# Patient Record
Sex: FEMALE | Race: ASIAN | NOT HISPANIC OR LATINO | URBAN - METROPOLITAN AREA
[De-identification: names, ages, dates, MRNs, and addresses within clinical notes are randomized per-mention and may not be internally consistent; named-entity substitution may affect disease eponyms.]

---

## 2017-01-01 ENCOUNTER — EMERGENCY (EMERGENCY)
Facility: HOSPITAL | Age: 62
LOS: 1 days | Discharge: HOME | End: 2017-01-01
Attending: EMERGENCY MEDICINE | Admitting: EMERGENCY MEDICINE
Payer: MEDICAID

## 2017-01-01 VITALS
HEART RATE: 65 BPM | RESPIRATION RATE: 18 BRPM | DIASTOLIC BLOOD PRESSURE: 83 MMHG | OXYGEN SATURATION: 100 % | TEMPERATURE: 98 F | SYSTOLIC BLOOD PRESSURE: 147 MMHG

## 2017-01-01 VITALS
SYSTOLIC BLOOD PRESSURE: 140 MMHG | HEART RATE: 71 BPM | DIASTOLIC BLOOD PRESSURE: 71 MMHG | OXYGEN SATURATION: 98 % | TEMPERATURE: 98 F | RESPIRATION RATE: 18 BRPM

## 2017-01-01 PROBLEM — Z00.00 ENCOUNTER FOR PREVENTIVE HEALTH EXAMINATION: Status: ACTIVE | Noted: 2017-01-01

## 2017-01-01 PROCEDURE — 99283 EMERGENCY DEPT VISIT LOW MDM: CPT

## 2017-01-01 RX ORDER — IBUPROFEN 200 MG
600 TABLET ORAL ONCE
Qty: 0 | Refills: 0 | Status: DISCONTINUED | OUTPATIENT
Start: 2017-01-01 | End: 2017-01-05

## 2017-01-01 RX ORDER — LIDOCAINE 4 G/100G
1 CREAM TOPICAL ONCE
Qty: 0 | Refills: 0 | Status: DISCONTINUED | OUTPATIENT
Start: 2017-01-01 | End: 2017-01-05

## 2017-01-01 RX ORDER — VALACYCLOVIR 500 MG/1
1 TABLET, FILM COATED ORAL
Qty: 21 | Refills: 0 | OUTPATIENT
Start: 2017-01-01 | End: 2017-01-08

## 2017-01-01 RX ORDER — LIDOCAINE HCL, METHYL SALICYLATE, MENTHOL, CAPSAICIN .04; 33.28; 12.1; 4.84 G/121G; G/121G; G/121G; G/121G
1 OINTMENT TOPICAL
Qty: 1 | Refills: 0 | OUTPATIENT
Start: 2017-01-01

## 2017-01-01 RX ORDER — VALACYCLOVIR 500 MG/1
1000 TABLET, FILM COATED ORAL ONCE
Qty: 0 | Refills: 0 | Status: COMPLETED | OUTPATIENT
Start: 2017-01-01 | End: 2017-01-01

## 2017-01-01 RX ADMIN — VALACYCLOVIR 1000 MILLIGRAM(S): 500 TABLET, FILM COATED ORAL at 17:19

## 2017-01-01 NOTE — ED ADULT NURSE NOTE - OBJECTIVE STATEMENT
Pt received to room 13, Aox3 and ambulatory. C/o rash on left arm and burning pain in arm and back upon movement. Patient states she has to take motrin q4 hours since last week to prevent the pain. Denies SOB, CP, N/V, dizziness, fever. States she has had some loss of apetite recently. Rash on arm looks like little bumps, on arm, hand and deltoid. Vitally stable, will continue to monitor.

## 2017-01-01 NOTE — ED PROVIDER NOTE - ATTENDING CONTRIBUTION TO CARE
I performed the initial face to face bedside interview with this patient regarding history of present illness, review of symptoms and past medical, social and family history and independent physical examination alongside the resident, Sarah Beth Figueroa.  I wrote the chart and discussed the case, impression, and plan with the resident and patient.

## 2017-01-01 NOTE — ED ADULT TRIAGE NOTE - CHIEF COMPLAINT QUOTE
Pt states since last week she had "burning" pain to left upper back and left arm. Yesterday pt noticed red dots to left arm. Co pain to rash area.

## 2017-01-01 NOTE — ED PROVIDER NOTE - OBJECTIVE STATEMENT
62 yo female with a rash and pain on her left arm.  Pt states she had arm pain for one week and noticed a vesicular rash yesterday.  she denies fevers chills nausea chest pain lightheadedness.  She has no sick contacts

## 2018-02-21 ENCOUNTER — EMERGENCY (EMERGENCY)
Facility: HOSPITAL | Age: 63
LOS: 1 days | Discharge: HOME | End: 2018-02-21
Attending: EMERGENCY MEDICINE | Admitting: EMERGENCY MEDICINE
Payer: MEDICAID

## 2018-02-21 VITALS
SYSTOLIC BLOOD PRESSURE: 118 MMHG | DIASTOLIC BLOOD PRESSURE: 66 MMHG | HEART RATE: 69 BPM | RESPIRATION RATE: 16 BRPM | TEMPERATURE: 98 F | OXYGEN SATURATION: 99 %

## 2018-02-21 PROCEDURE — 99282 EMERGENCY DEPT VISIT SF MDM: CPT

## 2018-02-21 NOTE — ED PROVIDER NOTE - OBJECTIVE STATEMENT
: [id number]  63F h/o HLD p/w bilateral eye itching x 1 week, R eye tearing for a few days. No purulence. Denies visual change, fever, contact lenses, trauma.

## 2018-02-21 NOTE — ED PROVIDER NOTE - ATTENDING CONTRIBUTION TO CARE
branden: hx thru pt, with assistance of  phone.  one week hx of irritated itching eyes. rt eye tearing more than usual past few days. clear tears.   no visual changes. no uri sx. no hx similar sx.   exam: vision rt eye 20/50; left 20/40.   no redness or swelling noted. pupils equal and reactive. lid everted, no FB.  rt cornea clear, pre and post fluoroscein.   impression: likely allergic-type sx,  recc: visine and tears. pt will f/u with her optometrist tomorrow for f/u glaucoma testing (last tested 2-3 years ago; pt told was nl). branden: hx thru pt, with assistance of  phone.  one week hx of irritated itching eyes. rt eye tearing more than usual past few days. clear tears.   no visual changes. no uri sx. no hx similar sx.   exam: vision rt eye 20/50; left 20/40. .  no redness or swelling noted. pupils equal and reactive. lid everted, no FB.  rt cornea clear, pre and post fluoroscein.   impression: likely allergic-type sx,  recc: visine and tears. pt will f/u with her optometrist tomorrow for f/u glaucoma testing (last tested 2-3 years ago; pt told was nl).

## 2018-02-21 NOTE — ED ADULT TRIAGE NOTE - CHIEF COMPLAINT QUOTE
pt c/o watery right eye x 1 week, states that she has pain in her eye.  Denies visual changes.  PMH: high cholesterol, osteoporosis

## 2018-02-21 NOTE — ED PROVIDER NOTE - EYE EXAM METHOD
bilateral PERRL, EOMI, no FB, inverted lids with no FB. No abrasions/ulcers on fluorescein/fluorescein/slit lamp

## 2019-07-20 ENCOUNTER — EMERGENCY (EMERGENCY)
Facility: HOSPITAL | Age: 64
LOS: 1 days | Discharge: HOME | End: 2019-07-20
Attending: EMERGENCY MEDICINE | Admitting: EMERGENCY MEDICINE
Payer: COMMERCIAL

## 2019-07-20 VITALS
SYSTOLIC BLOOD PRESSURE: 139 MMHG | RESPIRATION RATE: 16 BRPM | OXYGEN SATURATION: 100 % | HEART RATE: 51 BPM | TEMPERATURE: 98 F | DIASTOLIC BLOOD PRESSURE: 91 MMHG

## 2019-07-20 PROCEDURE — 99283 EMERGENCY DEPT VISIT LOW MDM: CPT

## 2019-07-20 RX ORDER — CLOTRIMAZOLE AND BETAMETHASONE DIPROPIONATE 10; .5 MG/G; MG/G
1 CREAM TOPICAL
Qty: 1 | Refills: 0
Start: 2019-07-20 | End: 2019-08-02

## 2019-07-20 NOTE — ED ADULT TRIAGE NOTE - CHIEF COMPLAINT QUOTE
Pt w/ hx of osteoporosis c/o atraumatic scabbing and bleeding on top of right foot.  dried blood and scabs noted to right foot.

## 2019-07-20 NOTE — ED PROVIDER NOTE - CLINICAL SUMMARY MEDICAL DECISION MAKING FREE TEXT BOX
Rt foot dorsum tinea dc on clotrimazole-bethamethasone. Rt foot sole wart, referred to PMD or pods to freeze wart.

## 2019-07-20 NOTE — ED PROVIDER NOTE - OBJECTIVE STATEMENT
64F c/o rt foot rash x 3 mo. Past 3 months patient notes dorsum right foot discolored and itchy. PMD prescribed 2 creams without resolution. Patient also notes lesion to base of right foot.

## 2019-07-20 NOTE — ED PROVIDER NOTE - NSFOLLOWUPINSTRUCTIONS_ED_ALL_ED_FT
Seen in ER for right shin and foot fungal infection and base of right foot wart. Please apply Clotrimazole-Betamethasone ointment twice daily x 2 wks. Please see Primary Care doctor or Podiatrist from list in 1 week. Return to Emergency Room for any new or worsening symptoms.

## 2019-07-21 PROBLEM — E78.5 HYPERLIPIDEMIA, UNSPECIFIED: Chronic | Status: ACTIVE | Noted: 2018-02-21

## 2020-01-06 ENCOUNTER — EMERGENCY (EMERGENCY)
Facility: HOSPITAL | Age: 65
LOS: 1 days | Discharge: HOME | End: 2020-01-06
Attending: EMERGENCY MEDICINE
Payer: MEDICAID

## 2020-01-06 VITALS
HEART RATE: 77 BPM | SYSTOLIC BLOOD PRESSURE: 101 MMHG | DIASTOLIC BLOOD PRESSURE: 63 MMHG | OXYGEN SATURATION: 99 % | RESPIRATION RATE: 20 BRPM | WEIGHT: 104.94 LBS | HEIGHT: 55 IN | TEMPERATURE: 98 F

## 2020-01-06 DIAGNOSIS — Z90.49 ACQUIRED ABSENCE OF OTHER SPECIFIED PARTS OF DIGESTIVE TRACT: Chronic | ICD-10-CM

## 2020-01-06 LAB
ALBUMIN SERPL ELPH-MCNC: 4.1 G/DL — SIGNIFICANT CHANGE UP (ref 3.3–5)
ALP SERPL-CCNC: 63 U/L — SIGNIFICANT CHANGE UP (ref 40–120)
ALT FLD-CCNC: 16 U/L — SIGNIFICANT CHANGE UP (ref 10–45)
ANION GAP SERPL CALC-SCNC: 11 MMOL/L — SIGNIFICANT CHANGE UP (ref 5–17)
APPEARANCE UR: CLEAR — SIGNIFICANT CHANGE UP
APTT BLD: 29.2 SEC — SIGNIFICANT CHANGE UP (ref 27.5–36.3)
AST SERPL-CCNC: 43 U/L — HIGH (ref 10–40)
BACTERIA # UR AUTO: NEGATIVE — SIGNIFICANT CHANGE UP
BASOPHILS # BLD AUTO: 0 K/UL — SIGNIFICANT CHANGE UP (ref 0–0.2)
BASOPHILS NFR BLD AUTO: 0 % — SIGNIFICANT CHANGE UP (ref 0–2)
BILIRUB SERPL-MCNC: 0.4 MG/DL — SIGNIFICANT CHANGE UP (ref 0.2–1.2)
BILIRUB UR-MCNC: NEGATIVE — SIGNIFICANT CHANGE UP
BUN SERPL-MCNC: 12 MG/DL — SIGNIFICANT CHANGE UP (ref 7–23)
CALCIUM SERPL-MCNC: 8.7 MG/DL — SIGNIFICANT CHANGE UP (ref 8.4–10.5)
CHLORIDE SERPL-SCNC: 102 MMOL/L — SIGNIFICANT CHANGE UP (ref 96–108)
CO2 SERPL-SCNC: 22 MMOL/L — SIGNIFICANT CHANGE UP (ref 22–31)
COLOR SPEC: YELLOW — SIGNIFICANT CHANGE UP
CREAT SERPL-MCNC: 0.48 MG/DL — LOW (ref 0.5–1.3)
DIFF PNL FLD: ABNORMAL
EOSINOPHIL # BLD AUTO: 0 K/UL — SIGNIFICANT CHANGE UP (ref 0–0.5)
EOSINOPHIL NFR BLD AUTO: 0 % — SIGNIFICANT CHANGE UP (ref 0–6)
EPI CELLS # UR: 2 /HPF — SIGNIFICANT CHANGE UP
GAS PNL BLDV: SIGNIFICANT CHANGE UP
GLUCOSE SERPL-MCNC: 103 MG/DL — HIGH (ref 70–99)
GLUCOSE UR QL: NEGATIVE — SIGNIFICANT CHANGE UP
HCT VFR BLD CALC: 41.2 % — SIGNIFICANT CHANGE UP (ref 34.5–45)
HGB BLD-MCNC: 12.5 G/DL — SIGNIFICANT CHANGE UP (ref 11.5–15.5)
HYALINE CASTS # UR AUTO: 6 /LPF — HIGH (ref 0–2)
INR BLD: 1.1 RATIO — SIGNIFICANT CHANGE UP (ref 0.88–1.16)
KETONES UR-MCNC: ABNORMAL
LEUKOCYTE ESTERASE UR-ACNC: NEGATIVE — SIGNIFICANT CHANGE UP
LIDOCAIN IGE QN: 26 U/L — SIGNIFICANT CHANGE UP (ref 7–60)
LYMPHOCYTES # BLD AUTO: 0.34 K/UL — LOW (ref 1–3.3)
LYMPHOCYTES # BLD AUTO: 4.4 % — LOW (ref 13–44)
MCHC RBC-ENTMCNC: 26.4 PG — LOW (ref 27–34)
MCHC RBC-ENTMCNC: 30.3 GM/DL — LOW (ref 32–36)
MCV RBC AUTO: 86.9 FL — SIGNIFICANT CHANGE UP (ref 80–100)
MONOCYTES # BLD AUTO: 0.07 K/UL — SIGNIFICANT CHANGE UP (ref 0–0.9)
MONOCYTES NFR BLD AUTO: 0.9 % — LOW (ref 2–14)
NEUTROPHILS # BLD AUTO: 7.38 K/UL — SIGNIFICANT CHANGE UP (ref 1.8–7.4)
NEUTROPHILS NFR BLD AUTO: 94.7 % — HIGH (ref 43–77)
NITRITE UR-MCNC: NEGATIVE — SIGNIFICANT CHANGE UP
PH UR: 6 — SIGNIFICANT CHANGE UP (ref 5–8)
PLATELET # BLD AUTO: 144 K/UL — LOW (ref 150–400)
POTASSIUM SERPL-MCNC: 5.5 MMOL/L — HIGH (ref 3.5–5.3)
POTASSIUM SERPL-SCNC: 5.5 MMOL/L — HIGH (ref 3.5–5.3)
PROT SERPL-MCNC: 7.6 G/DL — SIGNIFICANT CHANGE UP (ref 6–8.3)
PROT UR-MCNC: ABNORMAL
PROTHROM AB SERPL-ACNC: 12.7 SEC — SIGNIFICANT CHANGE UP (ref 10–12.9)
RBC # BLD: 4.74 M/UL — SIGNIFICANT CHANGE UP (ref 3.8–5.2)
RBC # FLD: 13.8 % — SIGNIFICANT CHANGE UP (ref 10.3–14.5)
RBC CASTS # UR COMP ASSIST: 48 /HPF — HIGH (ref 0–4)
SODIUM SERPL-SCNC: 135 MMOL/L — SIGNIFICANT CHANGE UP (ref 135–145)
SP GR SPEC: 1.04 — HIGH (ref 1.01–1.02)
UROBILINOGEN FLD QL: NEGATIVE — SIGNIFICANT CHANGE UP
WBC # BLD: 7.79 K/UL — SIGNIFICANT CHANGE UP (ref 3.8–10.5)
WBC # FLD AUTO: 7.79 K/UL — SIGNIFICANT CHANGE UP (ref 3.8–10.5)
WBC UR QL: 4 /HPF — SIGNIFICANT CHANGE UP (ref 0–5)

## 2020-01-06 PROCEDURE — 85014 HEMATOCRIT: CPT

## 2020-01-06 PROCEDURE — 85730 THROMBOPLASTIN TIME PARTIAL: CPT

## 2020-01-06 PROCEDURE — 85610 PROTHROMBIN TIME: CPT

## 2020-01-06 PROCEDURE — 76700 US EXAM ABDOM COMPLETE: CPT | Mod: 26

## 2020-01-06 PROCEDURE — 93010 ELECTROCARDIOGRAM REPORT: CPT

## 2020-01-06 PROCEDURE — 83690 ASSAY OF LIPASE: CPT

## 2020-01-06 PROCEDURE — 99284 EMERGENCY DEPT VISIT MOD MDM: CPT

## 2020-01-06 PROCEDURE — 84132 ASSAY OF SERUM POTASSIUM: CPT

## 2020-01-06 PROCEDURE — 82803 BLOOD GASES ANY COMBINATION: CPT

## 2020-01-06 PROCEDURE — 82565 ASSAY OF CREATININE: CPT

## 2020-01-06 PROCEDURE — 74177 CT ABD & PELVIS W/CONTRAST: CPT | Mod: 26

## 2020-01-06 PROCEDURE — 96374 THER/PROPH/DIAG INJ IV PUSH: CPT

## 2020-01-06 PROCEDURE — 71046 X-RAY EXAM CHEST 2 VIEWS: CPT

## 2020-01-06 PROCEDURE — 83605 ASSAY OF LACTIC ACID: CPT

## 2020-01-06 PROCEDURE — 82330 ASSAY OF CALCIUM: CPT

## 2020-01-06 PROCEDURE — 96375 TX/PRO/DX INJ NEW DRUG ADDON: CPT

## 2020-01-06 PROCEDURE — 82435 ASSAY OF BLOOD CHLORIDE: CPT

## 2020-01-06 PROCEDURE — 82947 ASSAY GLUCOSE BLOOD QUANT: CPT

## 2020-01-06 PROCEDURE — 93005 ELECTROCARDIOGRAM TRACING: CPT

## 2020-01-06 PROCEDURE — 80053 COMPREHEN METABOLIC PANEL: CPT

## 2020-01-06 PROCEDURE — 71046 X-RAY EXAM CHEST 2 VIEWS: CPT | Mod: 26

## 2020-01-06 PROCEDURE — 74177 CT ABD & PELVIS W/CONTRAST: CPT

## 2020-01-06 PROCEDURE — 84295 ASSAY OF SERUM SODIUM: CPT

## 2020-01-06 PROCEDURE — 76700 US EXAM ABDOM COMPLETE: CPT

## 2020-01-06 PROCEDURE — 81001 URINALYSIS AUTO W/SCOPE: CPT

## 2020-01-06 PROCEDURE — 99285 EMERGENCY DEPT VISIT HI MDM: CPT | Mod: 25

## 2020-01-06 PROCEDURE — 85027 COMPLETE CBC AUTOMATED: CPT

## 2020-01-06 PROCEDURE — 87086 URINE CULTURE/COLONY COUNT: CPT

## 2020-01-06 RX ORDER — ONDANSETRON 8 MG/1
4 TABLET, FILM COATED ORAL ONCE
Refills: 0 | Status: DISCONTINUED | OUTPATIENT
Start: 2020-01-06 | End: 2020-01-06

## 2020-01-06 RX ORDER — SODIUM CHLORIDE 9 MG/ML
1000 INJECTION INTRAMUSCULAR; INTRAVENOUS; SUBCUTANEOUS ONCE
Refills: 0 | Status: COMPLETED | OUTPATIENT
Start: 2020-01-06 | End: 2020-01-06

## 2020-01-06 RX ORDER — ONDANSETRON 8 MG/1
4 TABLET, FILM COATED ORAL ONCE
Refills: 0 | Status: COMPLETED | OUTPATIENT
Start: 2020-01-06 | End: 2020-01-06

## 2020-01-06 RX ORDER — ACETAMINOPHEN 500 MG
975 TABLET ORAL ONCE
Refills: 0 | Status: COMPLETED | OUTPATIENT
Start: 2020-01-06 | End: 2020-01-06

## 2020-01-06 RX ORDER — FAMOTIDINE 10 MG/ML
20 INJECTION INTRAVENOUS ONCE
Refills: 0 | Status: COMPLETED | OUTPATIENT
Start: 2020-01-06 | End: 2020-01-06

## 2020-01-06 RX ADMIN — ONDANSETRON 4 MILLIGRAM(S): 8 TABLET, FILM COATED ORAL at 22:30

## 2020-01-06 RX ADMIN — FAMOTIDINE 20 MILLIGRAM(S): 10 INJECTION INTRAVENOUS at 22:25

## 2020-01-06 RX ADMIN — Medication 975 MILLIGRAM(S): at 22:25

## 2020-01-06 RX ADMIN — SODIUM CHLORIDE 1000 MILLILITER(S): 9 INJECTION INTRAMUSCULAR; INTRAVENOUS; SUBCUTANEOUS at 23:23

## 2020-01-06 RX ADMIN — Medication 30 MILLILITER(S): at 22:25

## 2020-01-06 NOTE — ED PROVIDER NOTE - NSFOLLOWUPINSTRUCTIONS_ED_ALL_ED_FT
You have a viral infection of your intestinal tract - your body will fight this off in a few days  1.Continue to hydrate yourself well throughout the day   2. Take zofran up to every 8 hours as needed for nausea/vomiting  3. Follow-up with your primary care physician this week   4. Return to the ER for any fevers, severe pain, persistent vomiting, urinary symptoms, or other new/concerning symptoms

## 2020-01-06 NOTE — ED PROVIDER NOTE - PATIENT PORTAL LINK FT
You can access the FollowMyHealth Patient Portal offered by [facility] by registering at the following website: [email or web address]. By joining [employer]’s FollowMyHealth portal, you will also be able to view your health information using other applications (apps) compatible with our system.

## 2020-01-06 NOTE — ED PROVIDER NOTE - OBJECTIVE STATEMENT
63 y/o female hx colon resection many years ago for unknown reason presents to the ED for acute onset of RUQ/epigasatric pain this morning. + nausea with dry heaving but no true vomiting. no diarrhea, no dysuria. no fever/chills.

## 2020-01-06 NOTE — ED PROVIDER NOTE - PROGRESS NOTE DETAILS
Dr. Holland's Note: At the beginning of my shift, i took over care of the patient from outgoing physician. On my evaluation, patient is looking very well, tolerating oral intake without difficulty. she feels much better than on arrival. CT came back showing enteritis without any other acute process.  Pt feels comfortable going home. I will prescribe her zofran and advised on oral hydration. pt knows to return to ED if sx worsen.
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
## 2020-01-06 NOTE — ED ADULT NURSE REASSESSMENT NOTE - NS ED NURSE REASSESS COMMENT FT1
Pt vomited immediately after taking Tylenol and Maalox. Dr. Chilel and Elise SEE notified and Zofran given as ordered.
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
## 2020-01-06 NOTE — ED PROVIDER NOTE - CARE PLAN
Principal Discharge DX:	Fever Principal Discharge DX:	Enteritis  Secondary Diagnosis:	Non-intractable vomiting with nausea

## 2020-01-07 VITALS
SYSTOLIC BLOOD PRESSURE: 111 MMHG | RESPIRATION RATE: 16 BRPM | OXYGEN SATURATION: 97 % | HEART RATE: 60 BPM | DIASTOLIC BLOOD PRESSURE: 63 MMHG | TEMPERATURE: 98 F

## 2020-01-07 RX ORDER — ONDANSETRON 8 MG/1
1 TABLET, FILM COATED ORAL
Qty: 15 | Refills: 0
Start: 2020-01-07

## 2020-01-08 LAB
CULTURE RESULTS: SIGNIFICANT CHANGE UP
SPECIMEN SOURCE: SIGNIFICANT CHANGE UP

## 2020-01-23 NOTE — ED ADULT NURSE NOTE - NS ED NURSE LEVEL OF CONSCIOUSNESS MENTAL STATUS
From: Preethi Frey  To: Estela Mistry MD  Sent: 1/22/2020 10:15 PM CST  Subject: Lab Test or Test Related Question    Hi Dr. Mistry, I was wondering if my pap results were back and also I know this isnt what you wanted to hear from me but I would like the IUD taken out. The bleeding it is causing is worse than what I was dealing with before. I know it takes time to adjust but I cannot handle 3-6 months of this. Maricel been bleeding since this was put in and the clots maricel been passing are very large for me. We could discuss another alternative if that is my obly other option. Thank you for your understating.   Alert/Awake

## 2021-07-07 NOTE — ED PROVIDER NOTE - MUSCULOSKELETAL, MLM
Render Post-Care Instructions In Note?: no Post-Care Instructions: I reviewed with the patient in detail post-care instructions. Patient is to wear sunprotection, and avoid picking at any of the treated lesions. Pt may apply Vaseline to crusted or scabbing areas. Number Of Freeze-Thaw Cycles: 1 freeze-thaw cycle Medical Necessity Clause: This procedure was medically necessary because the lesions that were treated were: Medical Necessity Information: It is in your best interest to select a reason for this procedure from the list below. All of these items fulfill various CMS LCD requirements except the new and changing color options. Consent: The patient's consent was obtained including but not limited to risks of crusting, scabbing, blistering, scarring, darker or lighter pigmentary change, recurrence, incomplete removal and infection. Detail Level: Simple Spine appears normal, range of motion is not limited, no muscle or joint tenderness

## 2023-09-02 ENCOUNTER — EMERGENCY (EMERGENCY)
Facility: HOSPITAL | Age: 68
LOS: 1 days | Discharge: HOME | End: 2023-09-02
Attending: EMERGENCY MEDICINE
Payer: MEDICARE

## 2023-09-02 VITALS
DIASTOLIC BLOOD PRESSURE: 68 MMHG | HEART RATE: 96 BPM | HEIGHT: 59 IN | TEMPERATURE: 98 F | OXYGEN SATURATION: 97 % | RESPIRATION RATE: 16 BRPM | SYSTOLIC BLOOD PRESSURE: 163 MMHG | WEIGHT: 110.01 LBS

## 2023-09-02 DIAGNOSIS — Z90.49 ACQUIRED ABSENCE OF OTHER SPECIFIED PARTS OF DIGESTIVE TRACT: Chronic | ICD-10-CM

## 2023-09-02 PROCEDURE — 99284 EMERGENCY DEPT VISIT MOD MDM: CPT
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
## 2023-09-03 PROCEDURE — 73610 X-RAY EXAM OF ANKLE: CPT | Mod: 26,LT

## 2023-09-03 PROCEDURE — 72131 CT LUMBAR SPINE W/O DYE: CPT | Mod: 26,MA

## 2023-09-03 PROCEDURE — 99284 EMERGENCY DEPT VISIT MOD MDM: CPT | Mod: 25

## 2023-09-03 PROCEDURE — 73610 X-RAY EXAM OF ANKLE: CPT

## 2023-09-03 PROCEDURE — 72131 CT LUMBAR SPINE W/O DYE: CPT | Mod: MA

## 2023-09-03 RX ORDER — ACETAMINOPHEN 500 MG
975 TABLET ORAL ONCE
Refills: 0 | Status: DISCONTINUED | OUTPATIENT
Start: 2023-09-03 | End: 2023-09-06

## 2023-09-03 NOTE — ED PROVIDER NOTE - NSFOLLOWUPCLINICS_GEN_ALL_ED_FT
[facility]  Orthopedics  [address] [place] [address]  Phone: [phone]  Fax: [phone]
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
## 2023-09-03 NOTE — ED PROVIDER NOTE - CARDIOVASCULAR NEGATIVE STATEMENT, MLM
In setting of bipap dependence will hold beta blocker and home lisinopril in setting of ANÍBAL. Will continue Tele-monitoring.   no chest pain and no edema.

## 2023-09-03 NOTE — ED PROVIDER NOTE - PATIENT PORTAL LINK FT
You can access the FollowMyHealth Patient Portal offered by [facility] by registering at the following website: [email or web address]. By joining [employer]’s FollowMyHealth portal, you will also be able to view your health information using other applications (apps) compatible with our system.

## 2023-09-03 NOTE — ED PROVIDER NOTE - PROGRESS NOTE DETAILS
feels well. neuro intact. xray negaive. ct with l1 compression fx. will dc. f/u with spine. return precautions discussed.
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
## 2023-09-03 NOTE — ED PROVIDER NOTE - NSFOLLOWUPINSTRUCTIONS_ED_ALL_ED_FT
Vertebral Compression Fracture    WHAT YOU NEED TO KNOW:    A vertebral compression fracture (VCF) is a collapse or breakdown in a bone in your spine. Compression fractures happen when there is too much pressure on the vertebra. VCFs most often occur in the thoracic (middle) and lumbar (lower) areas of your spine. Fractures may be mild to severe.  Compression Fracture of Spine    WHILE YOU ARE HERE:    Informed consent is a legal document that explains the tests, treatments, or procedures that you may need. Informed consent means you understand what will be done and can make decisions about what you want. You give your permission when you sign the consent form. You can have someone sign this form for you if you are not able to sign it. You have the right to understand your medical care in words you know. Before you sign the consent form, understand the risks and benefits of what will be done. Make sure all your questions are answered.    Medicines:    NSAIDs help decrease swelling and pain.    Acetaminophen decreases pain.    Prescription pain medicine may be given. Do not wait until the pain is severe before you ask for more medicine.    Bisphosphonates and calcitonin may be recommended to help your bones get stronger. They can decrease the pain of a VCF caused by osteoporosis, and decrease your risk for another fracture.  Tests:    X-rays, a CT scan, or MRI of your spine may be taken. You may be given a dye before the pictures are taken to help healthcare providers see the pictures better. Tell the healthcare provider if you have ever had an allergic reaction to contrast dye. You should not enter the MRI room with anything metal. Metal can cause serious injury. Tell the healthcare provider if you have any metal in or on your body.    A bone scan of your spine may be done. The pictures may show broken bones or other problems in the spine, such as an infection.  Treatment:    Surgery may be needed if your pain, weakness, or numbness does not go away after other treatments. Surgery may make your spine more stable, and help decrease pressure on your spinal nerves caused by the fracture.  Vertebroplasty is a procedure that is used to place bone cement into the fractured vertebrae.    Kyphoplasty is a procedure that uses a balloon to make a space in the fractured vertebrae. Bone cement is then put inside the space made by the balloon.    Open surgery returns bones to the right place and holds them together with wires, pins, plates, or screws.    Physical or occupational therapy may be started while you are in the hospital and continued after you are discharged. A physical therapist teaches you exercises to help improve movement and strength, and to decrease pain. An occupational therapist teaches you skills to help with your daily activities.  RISKS:    Even with treatment, signs and symptoms of a VCF may come back. You may develop a life-threatening blood clot if you are on bed rest or not able to move easily.    CARE AGREEMENT:    You have the right to help plan your care. Learn about your health condition and how it may be treated. Discuss treatment options with your healthcare providers to decide what care you want to receive. You always have the right to refuse treatment.    © [employer] 1973, 2023    	  back to top            © [employer] 1973, 2023

## 2023-09-03 NOTE — ED PROVIDER NOTE - PHYSICAL EXAMINATION
mild ttp lumbar spine. b/l LE strength 5/5 all muscle groups and gross sensation normal and equal in all dermatomes. ambulatory with steady gait.    left ankle: mild ttp over lateral malleolus. full ROM.

## 2023-09-03 NOTE — ED PROVIDER NOTE - CARE PROVIDER_API CALL
Danny Moe  Neurosurgery  [address] [place] [address]  Phone: [phone]  Fax: [phone]  Established Patient  Follow Up Time:

## 2023-09-03 NOTE — ED ADULT NURSE NOTE - NSFALLUNIVINTERV_ED_ALL_ED
Bed/Stretcher in lowest position, wheels locked, appropriate side rails in place/Call bell, personal items and telephone in reach/Instruct patient to call for assistance before getting out of bed/chair/stretcher/Non-slip footwear applied when patient is off stretcher/[place] to call system/Physically safe environment - no spills, clutter or unnecessary equipment/Purposeful proactive rounding/Room/bathroom lighting operational, light cord in reach

## 2023-09-12 ENCOUNTER — APPOINTMENT (OUTPATIENT)
Dept: ORTHOPEDIC SURGERY | Facility: CLINIC | Age: 68
End: 2023-09-12
Payer: MEDICARE

## 2023-09-12 PROCEDURE — 72070 X-RAY EXAM THORAC SPINE 2VWS: CPT

## 2023-09-12 PROCEDURE — 72040 X-RAY EXAM NECK SPINE 2-3 VW: CPT

## 2023-09-12 PROCEDURE — 22310 CLOSED TX VERT FX W/O MANJ: CPT

## 2023-09-12 PROCEDURE — 72100 X-RAY EXAM L-S SPINE 2/3 VWS: CPT

## 2023-09-12 PROCEDURE — 99204 OFFICE O/P NEW MOD 45 MIN: CPT

## 2023-10-03 ENCOUNTER — APPOINTMENT (OUTPATIENT)
Dept: NEUROSURGERY | Facility: CLINIC | Age: 68
End: 2023-10-03
Payer: MEDICARE

## 2023-10-03 VITALS
TEMPERATURE: 97.9 F | BODY MASS INDEX: 22.38 KG/M2 | HEIGHT: 59 IN | WEIGHT: 111 LBS | OXYGEN SATURATION: 97 % | HEART RATE: 58 BPM | DIASTOLIC BLOOD PRESSURE: 68 MMHG | SYSTOLIC BLOOD PRESSURE: 108 MMHG

## 2023-10-03 DIAGNOSIS — Z78.9 OTHER SPECIFIED HEALTH STATUS: ICD-10-CM

## 2023-10-03 DIAGNOSIS — Z87.39 PERSONAL HISTORY OF OTHER DISEASES OF THE MUSCULOSKELETAL SYSTEM AND CONNECTIVE TISSUE: ICD-10-CM

## 2023-10-03 DIAGNOSIS — Z86.39 PERSONAL HISTORY OF OTHER ENDOCRINE, NUTRITIONAL AND METABOLIC DISEASE: ICD-10-CM

## 2023-10-03 DIAGNOSIS — M54.9 DORSALGIA, UNSPECIFIED: ICD-10-CM

## 2023-10-03 DIAGNOSIS — S32.009A UNSPECIFIED FRACTURE OF UNSPECIFIED LUMBAR VERTEBRA, INITIAL ENCOUNTER FOR CLOSED FRACTURE: ICD-10-CM

## 2023-10-03 DIAGNOSIS — Z82.49 FAMILY HISTORY OF ISCHEMIC HEART DISEASE AND OTHER DISEASES OF THE CIRCULATORY SYSTEM: ICD-10-CM

## 2023-10-03 PROCEDURE — 99203 OFFICE O/P NEW LOW 30 MIN: CPT

## 2023-10-03 RX ORDER — SIMVASTATIN 80 MG/1
TABLET, FILM COATED ORAL
Refills: 0 | Status: ACTIVE | COMMUNITY

## 2023-10-03 RX ORDER — ALENDRONATE SODIUM 35 MG/1
TABLET ORAL
Refills: 0 | Status: ACTIVE | COMMUNITY